# Patient Record
Sex: FEMALE | Race: WHITE | NOT HISPANIC OR LATINO | Employment: UNEMPLOYED | URBAN - METROPOLITAN AREA
[De-identification: names, ages, dates, MRNs, and addresses within clinical notes are randomized per-mention and may not be internally consistent; named-entity substitution may affect disease eponyms.]

---
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
## 2024-05-24 ENCOUNTER — TRANSCRIBE ORDERS (OUTPATIENT)
Dept: ADMINISTRATIVE | Facility: HOSPITAL | Age: 19
End: 2024-05-24
Payer: COMMERCIAL

## 2024-05-24 DIAGNOSIS — N64.4 BREAST TENDERNESS: Primary | ICD-10-CM

## 2024-05-24 DIAGNOSIS — Z86.018 HISTORY OF BENIGN NEOPLASM: ICD-10-CM

## 2024-06-20 ENCOUNTER — HOSPITAL ENCOUNTER (OUTPATIENT)
Facility: HOSPITAL | Age: 19
Discharge: HOME | End: 2024-06-20
Admitting: FAMILY MEDICINE
Payer: COMMERCIAL

## 2024-06-20 DIAGNOSIS — Z86.018 HISTORY OF BENIGN NEOPLASM: ICD-10-CM

## 2024-06-20 DIAGNOSIS — N64.4 BREAST TENDERNESS: ICD-10-CM

## 2024-06-20 PROCEDURE — 76642 ULTRASOUND BREAST LIMITED: CPT

## 2025-04-07 ENCOUNTER — LAB (OUTPATIENT)
Dept: LAB | Facility: HOSPITAL | Age: 20
End: 2025-04-07
Payer: COMMERCIAL

## 2025-04-07 ENCOUNTER — OFFICE VISIT (OUTPATIENT)
Dept: FAMILY MEDICINE CLINIC | Facility: CLINIC | Age: 20
End: 2025-04-07
Payer: COMMERCIAL

## 2025-04-07 VITALS
TEMPERATURE: 98.6 F | OXYGEN SATURATION: 99 % | HEART RATE: 73 BPM | WEIGHT: 125.8 LBS | DIASTOLIC BLOOD PRESSURE: 76 MMHG | RESPIRATION RATE: 20 BRPM | SYSTOLIC BLOOD PRESSURE: 100 MMHG

## 2025-04-07 DIAGNOSIS — K59.04 CHRONIC IDIOPATHIC CONSTIPATION: ICD-10-CM

## 2025-04-07 DIAGNOSIS — K58.1 IRRITABLE BOWEL SYNDROME WITH CONSTIPATION: ICD-10-CM

## 2025-04-07 LAB
ALBUMIN SERPL-MCNC: 4.2 G/DL (ref 3.5–5.2)
ALBUMIN/GLOB SERPL: 1.3 G/DL
ALP SERPL-CCNC: 62 U/L (ref 39–117)
ALT SERPL W P-5'-P-CCNC: 8 U/L (ref 1–33)
ANION GAP SERPL CALCULATED.3IONS-SCNC: 14.9 MMOL/L (ref 5–15)
AST SERPL-CCNC: 22 U/L (ref 1–32)
BASOPHILS # BLD AUTO: 0.06 10*3/MM3 (ref 0–0.2)
BASOPHILS NFR BLD AUTO: 1 % (ref 0–1.5)
BILIRUB SERPL-MCNC: 0.3 MG/DL (ref 0–1.2)
BUN SERPL-MCNC: 12 MG/DL (ref 6–20)
BUN/CREAT SERPL: 13.8 (ref 7–25)
CALCIUM SPEC-SCNC: 9.4 MG/DL (ref 8.6–10.5)
CHLORIDE SERPL-SCNC: 101 MMOL/L (ref 98–107)
CO2 SERPL-SCNC: 22.1 MMOL/L (ref 22–29)
CREAT SERPL-MCNC: 0.87 MG/DL (ref 0.57–1)
DEPRECATED RDW RBC AUTO: 41.8 FL (ref 37–54)
EGFRCR SERPLBLD CKD-EPI 2021: 97.3 ML/MIN/1.73
EOSINOPHIL # BLD AUTO: 0.17 10*3/MM3 (ref 0–0.4)
EOSINOPHIL NFR BLD AUTO: 2.7 % (ref 0.3–6.2)
ERYTHROCYTE [DISTWIDTH] IN BLOOD BY AUTOMATED COUNT: 12.9 % (ref 12.3–15.4)
GLOBULIN UR ELPH-MCNC: 3.3 GM/DL
GLUCOSE SERPL-MCNC: 83 MG/DL (ref 65–99)
HCT VFR BLD AUTO: 44.6 % (ref 34–46.6)
HGB BLD-MCNC: 13.8 G/DL (ref 12–15.9)
IMM GRANULOCYTES # BLD AUTO: 0.01 10*3/MM3 (ref 0–0.05)
IMM GRANULOCYTES NFR BLD AUTO: 0.2 % (ref 0–0.5)
LYMPHOCYTES # BLD AUTO: 2.78 10*3/MM3 (ref 0.7–3.1)
LYMPHOCYTES NFR BLD AUTO: 44.3 % (ref 19.6–45.3)
MCH RBC QN AUTO: 27.7 PG (ref 26.6–33)
MCHC RBC AUTO-ENTMCNC: 30.9 G/DL (ref 31.5–35.7)
MCV RBC AUTO: 89.6 FL (ref 79–97)
MONOCYTES # BLD AUTO: 0.46 10*3/MM3 (ref 0.1–0.9)
MONOCYTES NFR BLD AUTO: 7.3 % (ref 5–12)
NEUTROPHILS NFR BLD AUTO: 2.79 10*3/MM3 (ref 1.7–7)
NEUTROPHILS NFR BLD AUTO: 44.5 % (ref 42.7–76)
NRBC BLD AUTO-RTO: 0 /100 WBC (ref 0–0.2)
PLATELET # BLD AUTO: 340 10*3/MM3 (ref 140–450)
PMV BLD AUTO: 10.3 FL (ref 6–12)
POTASSIUM SERPL-SCNC: 3.8 MMOL/L (ref 3.5–5.2)
PROT SERPL-MCNC: 7.5 G/DL (ref 6–8.5)
RBC # BLD AUTO: 4.98 10*6/MM3 (ref 3.77–5.28)
SODIUM SERPL-SCNC: 138 MMOL/L (ref 136–145)
TSH SERPL DL<=0.05 MIU/L-ACNC: 0.83 UIU/ML (ref 0.27–4.2)
WBC NRBC COR # BLD AUTO: 6.27 10*3/MM3 (ref 3.4–10.8)

## 2025-04-07 PROCEDURE — 80050 GENERAL HEALTH PANEL: CPT

## 2025-04-07 PROCEDURE — 99204 OFFICE O/P NEW MOD 45 MIN: CPT | Performed by: STUDENT IN AN ORGANIZED HEALTH CARE EDUCATION/TRAINING PROGRAM

## 2025-04-07 PROCEDURE — 86364 TISS TRNSGLTMNASE EA IG CLAS: CPT

## 2025-04-07 PROCEDURE — 86231 EMA EACH IG CLASS: CPT

## 2025-04-07 PROCEDURE — 82784 ASSAY IGA/IGD/IGG/IGM EACH: CPT

## 2025-04-07 RX ORDER — CETIRIZINE HYDROCHLORIDE 10 MG/1
10 TABLET ORAL DAILY
COMMUNITY

## 2025-04-07 RX ORDER — NORGESTIMATE AND ETHINYL ESTRADIOL 0.25-0.035
1 KIT ORAL DAILY
COMMUNITY
Start: 2025-03-22

## 2025-04-07 NOTE — PROGRESS NOTES
Established Patient Office Visit        Subjective      Chief Complaint:  Establish Care      History of Present Illness: Tiffanie Mcgarry is a 19 y.o. female who presents for stomach pain.     She can have trouble with no stool for 2 weeks. She is required to take docusate, miralax, sennakot, probiotic.     Effects her quality of life, trouble with sleep.  With worsening constipation abdominal pain she gets some occasional dizziness as well.  Colonoscopy last year with [facility] normal per report. She has had constipation since 6 years old.     CT scan from [facility] a year ago normal and reassuring last July.  CBC, CMP, urinalysis reassuring at that time as well which was reviewed today.    PHQ-9 Depression Screening  Little interest or pleasure in doing things? Nearly every day   Feeling down, depressed, or hopeless? Nearly every day   PHQ-2 Total Score 6   Trouble falling or staying asleep, or sleeping too much? Not at all   Feeling tired or having little energy? Nearly every day   Poor appetite or overeating? Several days   Feeling bad about yourself - or that you are a failure or have let yourself or your family down? Nearly every day   Trouble concentrating on things, such as reading the newspaper or watching television? Nearly every day   Moving or speaking so slowly that other people could have noticed? Or the opposite - being so fidgety or restless that you have been moving around a lot more than usual? Nearly every day     Thoughts that you would be better off dead, or of hurting yourself in some way? Not at all   PHQ-9 Total Score 19   If you checked off any problems, how difficult have these problems made it for you to do your work, take care of things at home, or get along with other people? Somewhat difficult     States this is constipation dependent.       Patient Active Problem List   Diagnosis    Irritable bowel syndrome with constipation    Chronic idiopathic constipation         Current Outpatient  Medications:     cetirizine (zyrTEC) 10 MG tablet, Take 1 tablet by mouth Daily., Disp: , Rfl:     CHLOROPHYLL PO, Take  by mouth., Disp: , Rfl:     Collagen-Vitamin C-Biotin (COLLAGEN PO), Take  by mouth., Disp: , Rfl:     docusate sodium (COLACE) 50 MG capsule, Take 1 capsule by mouth Every Night., Disp: , Rfl:     norgestimate-ethinyl estradiol (ORTHO-CYCLEN) 0.25-35 MG-MCG per tablet, Take 1 tablet by mouth Daily., Disp: , Rfl:     Polyethylene Glycol 3350 (MIRALAX PO), Take  by mouth., Disp: , Rfl:     TURMERIC PO, Take  by mouth., Disp: , Rfl:     Zinc Sulfate (ZINC 15 PO), Take  by mouth., Disp: , Rfl:     HYDROcodone-acetaminophen (NORCO) 5-325 MG per tablet, Take 1 tablet by mouth Every 4 (Four) Hours As Needed for pain (Patient not taking: Reported on 4/7/2025), Disp: 16 tablet, Rfl: 0    linaclotide (Linzess) 72 MCG capsule capsule, Take 1 capsule by mouth Every Morning Before Breakfast., Disp: 30 capsule, Rfl: 5       Objective     Physical Exam:   Vital Signs:   /76 (BP Location: Left arm, Patient Position: Sitting, Cuff Size: Adult)   Pulse 73   Temp 98.6 °F (37 °C) (Infrared)   Resp 20   Wt 57.1 kg (125 lb 12.8 oz)   SpO2 99%      Physical Exam  Constitutional:       General: She is not in acute distress.     Appearance: She is not ill-appearing.   Cardiovascular:      Rate and Rhythm: Normal rate and regular rhythm.   Pulmonary:      Effort: Pulmonary effort is normal.      Breath sounds: Normal breath sounds.   Neurological:      Mental Status: She is alert.   Psychiatric:         Thought Content: Thought content normal.   Abdomen soft, no mass, tenderness to palpation of upper quadrants no rebound    CT Abdomen Pelvis With Contrast  Order: [id number]  Impression    No acute findings in the abdomen or pelvis to account for the patient's  symptoms.            Images personally reviewed, interpreted and dictated by TIMOTHY Escoto M.D.  Narrative    CT SCAN OF THE ABDOMEN AND PELVIS WITH  CONTRAST    7/6/2024 7:26 PM    HISTORY:  Abdominal pain, acute, nonlocalized    PROCEDURE:  Axial CT images were obtained from the lung bases to the pubic symphysis  following IV contrast administration. Coronal and sagittal reformatted  images were generated from the axial data set and provided for  interpretation. This study was performed with techniques to keep  radiation doses as low as reasonably achievable, (ALARA). Individualized  dose reduction techniques using automated exposure control or adjustment  of mA and/or kV according to the patient size were employed.    COMPARISON:  None.    FINDINGS:  LOWER CHEST:  The heart is normal in size. Lung bases are clear.    ABDOMEN/PELVIS:  Liver, gallbladder and bile ducts:  The liver enhances homogenously without suspicious focal hepatic lesion.  Unremarkable gallbladder. No significant biliary ductal dilatation.    Adrenal glands:  The adrenal glands are morphologically unremarkable without suspicious  lesion.    Kidneys, ureters and urinary bladder:  No suspicious renal lesions. No hydronephrosis. Unremarkable urinary  bladder.    Spleen:  The spleen is normal in size.    Pancreas:  The pancreas is unremarkable.    Gastrointestinal system and mesentery:  There is no evidence of bowel obstruction. The appendix is visualized  and unremarkable. No significant mesenteric inflammation.    Lymph nodes:  No pathologically enlarged abdominal or pelvic lymph nodes are present.    Vessels:  The abdominal aorta is normal in caliber. The celiac trunk, superior  mesenteric artery, inferior mesenteric artery and their branch vessels  appear grossly patent. The superior mesenteric vein, splenic vein and  main portal veins are patent. The inferior vena cava and hepatic veins  are unremarkable.    Peritoneum:  No free intraperitoneal fluid or pneumoperitoneum.    Pelvic viscera:  No acute findings.    Body wall:  No acute findings. No significant body wall hernias.    Bones:  No  acute fracture.  Exam End: 07/06/24 19:42    Specimen Collected: 07/06/24 19:55 Last Resulted: 07/06/24 20:26            Assessment / Plan      Assessment/Plan:   Diagnoses and all orders for this visit:    1. Chronic idiopathic constipation  Assessment & Plan:  Failed docusate, miralax, sennakot, probiotic.   She had no improvement with clean out for colonoscopy   Start linzess     Orders:  -     Comprehensive Metabolic Panel; Future  -     CBC & Differential; Future  -     TSH Rfx On Abnormal To Free T4; Future  -     Celiac Disease Panel; Future  -     linaclotide (Linzess) 72 MCG capsule capsule; Take 1 capsule by mouth Every Morning Before Breakfast.  Dispense: 30 capsule; Refill: 5    2. Irritable bowel syndrome with constipation  -     linaclotide (Linzess) 72 MCG capsule capsule; Take 1 capsule by mouth Every Morning Before Breakfast.  Dispense: 30 capsule; Refill: 5       Chronic uncontrolled problems as above.  Med management, labs  Get colonoscopy report from  [facility]  Low FODMAP diet  Send to [facility] for potential manometry if still having trouble    Follow-up again on mood next visit.  She states this is linked to constipation    Follow Up:   Return in about 2 months (around 6/7/2025) for Follow-up constipation. .    MDM:     Napoleon Morales MD  [facility]

## 2025-04-07 NOTE — ASSESSMENT & PLAN NOTE
Failed docusate, miralax, sennakot, probiotic.   She had no improvement with clean out for colonoscopy   Start linzess

## 2025-04-08 LAB
ENDOMYSIUM IGA SER QL: NEGATIVE
IGA SERPL-MCNC: 182 MG/DL (ref 87–352)
TTG IGA SER-ACNC: <2 U/ML (ref 0–3)

## 2025-07-03 ENCOUNTER — OFFICE VISIT (OUTPATIENT)
Dept: FAMILY MEDICINE CLINIC | Facility: CLINIC | Age: 20
End: 2025-07-03
Payer: COMMERCIAL

## 2025-07-03 VITALS
BODY MASS INDEX: 17.88 KG/M2 | SYSTOLIC BLOOD PRESSURE: 108 MMHG | OXYGEN SATURATION: 98 % | WEIGHT: 118 LBS | TEMPERATURE: 98.4 F | DIASTOLIC BLOOD PRESSURE: 68 MMHG | HEART RATE: 78 BPM | HEIGHT: 68 IN

## 2025-07-03 DIAGNOSIS — R63.6 UNDERWEIGHT: ICD-10-CM

## 2025-07-03 DIAGNOSIS — M62.89 PELVIC FLOOR DYSFUNCTION: ICD-10-CM

## 2025-07-03 DIAGNOSIS — K59.04 CHRONIC IDIOPATHIC CONSTIPATION: ICD-10-CM

## 2025-07-03 DIAGNOSIS — Z30.41 ORAL CONTRACEPTIVE USE: Primary | ICD-10-CM

## 2025-07-03 DIAGNOSIS — K58.1 IRRITABLE BOWEL SYNDROME WITH CONSTIPATION: ICD-10-CM

## 2025-07-03 RX ORDER — NORGESTIMATE AND ETHINYL ESTRADIOL 0.25-0.035
1 KIT ORAL DAILY
Qty: 84 TABLET | Refills: 4 | Status: SHIPPED | OUTPATIENT
Start: 2025-07-03

## 2025-07-03 NOTE — PROGRESS NOTES
"  Established Patient Office Visit        Subjective      Chief Complaint:  Follow-up (2 month follow up for constipation)      History of Present Illness: Tiffanie Mcgarry is a 20 y.o. female who presents for IBS and constipation.     Stooling and bloating greatly improved. ]    During flairs of IBS has some lower abdominal swelling and some trouble with urethral burning during IBS flair. Occurs when eating trigger foods.     [facility] colonoscopy report reviewed.  Biopsy report reviewed ganglion cell clusters seen in submucosa ganglion cells and small nerves and submucosa no neuronal hyperplasia no myenteric plexus within specimen or muscularis propria seen within biopsy.  Note reviewed.  Recommended considering Trulance, Motegrity, possible daily glycerin suppository.  Consideration of MRI to defecography manometry and sits marker exam noted   Patient Active Problem List   Diagnosis    Irritable bowel syndrome with constipation    Chronic idiopathic constipation    Oral contraceptive use         Current Outpatient Medications:     cetirizine (zyrTEC) 10 MG tablet, Take 1 tablet by mouth Daily., Disp: , Rfl:     CHLOROPHYLL PO, Take  by mouth., Disp: , Rfl:     Collagen-Vitamin C-Biotin (COLLAGEN PO), Take  by mouth., Disp: , Rfl:     docusate sodium (COLACE) 50 MG capsule, Take 1 capsule by mouth Every Night., Disp: , Rfl:     linaclotide (Linzess) 72 MCG capsule capsule, Take 2 capsules by mouth Every Morning Before Breakfast., Disp: 180 capsule, Rfl: 3    norgestimate-ethinyl estradiol (ORTHO-CYCLEN) 0.25-35 MG-MCG per tablet, Take 1 tablet by mouth Daily., Disp: 84 tablet, Rfl: 4    TURMERIC PO, Take  by mouth., Disp: , Rfl:     Zinc Sulfate (ZINC 15 PO), Take  by mouth., Disp: , Rfl:        Objective     Physical Exam:   Vital Signs:   /68   Pulse 78   Temp 98.4 °F (36.9 °C) (Infrared)   Ht 172.7 cm (68\")   Wt 53.5 kg (118 lb)   LMP 06/29/2025 (Exact Date)   SpO2 98%   BMI 17.94 kg/m²      Physical " Exam  Constitutional:       General: She is not in acute distress.     Appearance: She is not ill-appearing.   Neurological:      Mental Status: She is alert.   Psychiatric:         Thought Content: Thought content normal.   Abd soft non-tender         Assessment / Plan      Assessment/Plan:   Diagnoses and all orders for this visit:    1. Oral contraceptive use (Primary)  -     norgestimate-ethinyl estradiol (ORTHO-CYCLEN) 0.25-35 MG-MCG per tablet; Take 1 tablet by mouth Daily.  Dispense: 84 tablet; Refill: 4  Discussed potential risk of this medicine continue.  Discussed less than 10% failure rate with appropriate use    2. Chronic idiopathic constipation  Assessment & Plan:  Failed docusate, miralax, sennakot, probiotic.   No improvement on trulance in past   Doing well on linzess 72 mg.     Orders:  -     Ambulatory Referral to Physical Therapy for Evaluation & Treatment  -     linaclotide (Linzess) 72 MCG capsule capsule; Take 2 capsules by mouth Every Morning Before Breakfast.  Dispense: 180 capsule; Refill: 3    3. Irritable bowel syndrome with constipation  Assessment & Plan:  Ibgard peppermint  oil    Orders:  -     Ambulatory Referral to Physical Therapy for Evaluation & Treatment  -     linaclotide (Linzess) 72 MCG capsule capsule; Take 2 capsules by mouth Every Morning Before Breakfast.  Dispense: 180 capsule; Refill: 3    4. Pelvic floor dysfunction  -     Ambulatory Referral to Physical Therapy for Evaluation & Treatment    5. Underweight  -     Ambulatory Referral to Nutrition Services           Follow Up:   Return in about 1 year (around 7/3/2026).    MDM:     Napoleon Morales MD  Family Medicine - [facility]

## 2025-07-03 NOTE — ASSESSMENT & PLAN NOTE
Failed docusate, miralax, sennakot, probiotic.   No improvement on trulance in past   Doing well on linzess 72 mg.